# Patient Record
Sex: MALE | Race: WHITE | NOT HISPANIC OR LATINO | Employment: OTHER | URBAN - METROPOLITAN AREA
[De-identification: names, ages, dates, MRNs, and addresses within clinical notes are randomized per-mention and may not be internally consistent; named-entity substitution may affect disease eponyms.]

---

## 2017-03-04 ENCOUNTER — HOSPITAL ENCOUNTER (EMERGENCY)
Facility: CLINIC | Age: 62
Discharge: HOME | End: 2017-03-04
Attending: EMERGENCY MEDICINE | Admitting: EMERGENCY MEDICINE
Payer: COMMERCIAL

## 2017-03-04 VITALS
RESPIRATION RATE: 18 BRPM | BODY MASS INDEX: 28.44 KG/M2 | OXYGEN SATURATION: 94 % | WEIGHT: 210 LBS | HEART RATE: 58 BPM | TEMPERATURE: 98.2 F | HEIGHT: 72 IN | DIASTOLIC BLOOD PRESSURE: 95 MMHG | SYSTOLIC BLOOD PRESSURE: 173 MMHG

## 2017-03-04 DIAGNOSIS — T50.905A MEDICATION SIDE EFFECTS, INITIAL ENCOUNTER: ICD-10-CM

## 2017-03-04 DIAGNOSIS — I10 ESSENTIAL HYPERTENSION: ICD-10-CM

## 2017-03-04 LAB
ANION GAP SERPL CALCULATED.3IONS-SCNC: 8 MMOL/L (ref 3–14)
BASOPHILS # BLD AUTO: 0.1 10E9/L (ref 0–0.2)
BASOPHILS NFR BLD AUTO: 0.9 %
BUN SERPL-MCNC: 18 MG/DL (ref 7–30)
CALCIUM SERPL-MCNC: 8.6 MG/DL (ref 8.5–10.1)
CHLORIDE SERPL-SCNC: 105 MMOL/L (ref 94–109)
CO2 SERPL-SCNC: 27 MMOL/L (ref 20–32)
CREAT SERPL-MCNC: 0.86 MG/DL (ref 0.66–1.25)
DIFFERENTIAL METHOD BLD: NORMAL
EOSINOPHIL # BLD AUTO: 0.1 10E9/L (ref 0–0.7)
EOSINOPHIL NFR BLD AUTO: 1.2 %
ERYTHROCYTE [DISTWIDTH] IN BLOOD BY AUTOMATED COUNT: 13.3 % (ref 10–15)
GFR SERPL CREATININE-BSD FRML MDRD: ABNORMAL ML/MIN/1.7M2
GLUCOSE SERPL-MCNC: 128 MG/DL (ref 70–99)
HCT VFR BLD AUTO: 42.9 % (ref 40–53)
HGB BLD-MCNC: 14.7 G/DL (ref 13.3–17.7)
IMM GRANULOCYTES # BLD: 0 10E9/L (ref 0–0.4)
IMM GRANULOCYTES NFR BLD: 0.4 %
LYMPHOCYTES # BLD AUTO: 2.3 10E9/L (ref 0.8–5.3)
LYMPHOCYTES NFR BLD AUTO: 27.2 %
MAGNESIUM SERPL-MCNC: 2.1 MG/DL (ref 1.6–2.3)
MCH RBC QN AUTO: 29.6 PG (ref 26.5–33)
MCHC RBC AUTO-ENTMCNC: 34.3 G/DL (ref 31.5–36.5)
MCV RBC AUTO: 87 FL (ref 78–100)
MONOCYTES # BLD AUTO: 0.9 10E9/L (ref 0–1.3)
MONOCYTES NFR BLD AUTO: 10.3 %
NEUTROPHILS # BLD AUTO: 5.1 10E9/L (ref 1.6–8.3)
NEUTROPHILS NFR BLD AUTO: 60 %
NRBC # BLD AUTO: 0 10*3/UL
NRBC BLD AUTO-RTO: 0 /100
PLATELET # BLD AUTO: 260 10E9/L (ref 150–450)
POTASSIUM SERPL-SCNC: 4.2 MMOL/L (ref 3.4–5.3)
RBC # BLD AUTO: 4.96 10E12/L (ref 4.4–5.9)
SODIUM SERPL-SCNC: 140 MMOL/L (ref 133–144)
TROPONIN I SERPL-MCNC: NORMAL UG/L (ref 0–0.04)
WBC # BLD AUTO: 8.6 10E9/L (ref 4–11)

## 2017-03-04 PROCEDURE — 83735 ASSAY OF MAGNESIUM: CPT | Performed by: EMERGENCY MEDICINE

## 2017-03-04 PROCEDURE — 99284 EMERGENCY DEPT VISIT MOD MDM: CPT

## 2017-03-04 PROCEDURE — 84484 ASSAY OF TROPONIN QUANT: CPT | Performed by: EMERGENCY MEDICINE

## 2017-03-04 PROCEDURE — 85025 COMPLETE CBC W/AUTO DIFF WBC: CPT | Performed by: EMERGENCY MEDICINE

## 2017-03-04 PROCEDURE — 80048 BASIC METABOLIC PNL TOTAL CA: CPT | Performed by: EMERGENCY MEDICINE

## 2017-03-04 PROCEDURE — 93005 ELECTROCARDIOGRAM TRACING: CPT

## 2017-03-04 RX ORDER — LIDOCAINE 40 MG/G
CREAM TOPICAL
Status: DISCONTINUED | OUTPATIENT
Start: 2017-03-04 | End: 2017-03-04 | Stop reason: HOSPADM

## 2017-03-04 ASSESSMENT — ENCOUNTER SYMPTOMS
NUMBNESS: 1
TREMORS: 1
LIGHT-HEADEDNESS: 1
NERVOUS/ANXIOUS: 1

## 2017-03-04 NOTE — ED PROVIDER NOTES
"  History     Chief Complaint:  Light-headedness    HPI   Mark Anthony Peralta is a 61 year old male, with a history of hypertension, who presents with light-headedness. The patient reports being on Citalopram before and that it worked fine. He took himself off of it after his work stressors dissipated and he felt that he did not need the medication anymore. For the past year he notes having some tingling and numbness, progressively worsening for the past year. Yesterday he saw his PCP to be put back onto Citalopram for anxiety. He notes taking a pill last night and this morning. Today he felt the sudden onset of shakiness and uneasiness in his chest, prompting him to check his blood pressure and found it normal. However, he was still concerned by the \"waves in his body\" and light-headedness, prompting him to come here for evaluation.    Allergies:  No known drug allergies     Medications:    Lisinopril  Atenolol  Citalopram hydrobromide    Past Medical History:    Hypertension    Past Surgical History:    Colon surgery    Family History:    History reviewed. No pertinent family history.     Social History:  Smoking status: current every day  Alcohol use: yes   Marital Status:       Review of Systems   Neurological: Positive for tremors, light-headedness and numbness (feet bilateral for a while).   Psychiatric/Behavioral: The patient is nervous/anxious.    All other systems reviewed and are negative.      Physical Exam     Patient Vitals for the past 24 hrs:   BP Temp Temp src Pulse Heart Rate Resp SpO2 Height Weight   03/04/17 1615 (!) 173/95 - - 58 58 18 94 % - -   03/04/17 1530 (!) 185/95 - - - - - 94 % - -   03/04/17 1514 - 98.2  F (36.8  C) Oral - - 16 - 1.829 m (6') 95.3 kg (210 lb)   03/04/17 1508 (!) 188/99 - - - - - - - -   03/04/17 1502 (!) 188/99 98.2  F (36.8  C) Temporal 67 - 18 98 % 1.829 m (6') 95.3 kg (210 lb)      Physical Exam   Constitutional: He is oriented to person, place, and time. He appears " well-developed.   HENT:   Head: Normocephalic and atraumatic.   Right Ear: External ear normal.   Mouth/Throat: Oropharynx is clear and moist.   Eyes: Conjunctivae and EOM are normal. Pupils are equal, round, and reactive to light.   Neck: Normal range of motion. Neck supple. No JVD present.   Cardiovascular: Normal rate, regular rhythm and normal heart sounds.    Pulmonary/Chest: Effort normal and breath sounds normal.   Abdominal: Soft. Bowel sounds are normal. He exhibits no distension. There is no tenderness. There is no rebound.   Musculoskeletal: Normal range of motion.   Lymphadenopathy:     He has no cervical adenopathy.   Neurological: He is alert and oriented to person, place, and time. He displays normal reflexes. No cranial nerve deficit. He exhibits normal muscle tone. Coordination normal.   Skin: Skin is warm and dry.   Psychiatric: His behavior is normal. Judgment normal. His mood appears anxious.   Nursing note and vitals reviewed.      Emergency Department Course   ECG (15:11:52):  Rate 64 bpm. DE interval 146. QRS duration 82. QT/QTc 420/433. P-R-T axes 18 15 20.   Normal sinus rhythm.  Normal ECG.  Flattening of T wave compared to EKG 3/30/12  Interpreted at 1532 by Nilesh Cooley MD.     Laboratory:  CBC: WNL (WBC 8.6, HGB 14.7, )   BMP: Glucose 128 (H), o/w WNL (Creatinine 0.86)  1559 Troponin: <0.015  Magnesium: 2.1    Emergency Department Course:  Past medical records, nursing notes, and vitals reviewed.  1520: I performed an exam of the patient and obtained history, as documented above.   IV inserted and blood drawn.  1635: I rechecked the patient. Findings and plan explained to the Patient. Patient discharged home with instructions regarding supportive care, medications, and reasons to return. The importance of close follow-up was reviewed.      Impression & Plan      Medical Decision Making:  Mark Anthony Peralta is a 61 year old male who presents with complaints of  light-headedness and not feeling well after taking Citalopram. Patient's neurologic exam is normal. His blood pressure is high, but his mean arterial pressure is in the 120s to 130s and clinically I am not concerned for a hypertensive urgency or emergency. Patient's lab tests are unremarkable. He does have slight flattening of his T waves, but prior EKG is from 4 years ago and has had no real chest pain. Troponin is negative. Patient was offered reassurance. I suspect some of his symptoms are related to serotonin release due to the new SSRI institution and would recommend discontinuing thi and following up with primary care. Patient was asked to return with chest pain, increasing shortness of breath, or stroke-like symptoms.       Diagnosis:    ICD-10-CM    1. Essential hypertension I10    2. Medication side effects, initial encounter T88.7XXA    3. Dizziness    Disposition:  discharged to home    Discharge Medications:  New Prescriptions    No medications on file         Melchor Hunt  3/4/2017   [facility] EMERGENCY DEPARTMENT  I, Melchor Hunt, am serving as a scribe at 3:20 PM on 3/4/2017 to document services personally performed by Nilesh Cooley MD based on my observations and the provider's statements to me.       Nilesh Cooley MD  03/11/17 4455

## 2017-03-04 NOTE — ED AVS SNAPSHOT
[facility]    [address] [place] [address]    Phone:  [phone]    Fax:  [phone]                                       Mark Anthony Peralta   MRN: [id number]    Department:  [facility]   Date of Visit:  3/4/2017           Patient Information     Date Of Birth          1955        Your diagnoses for this visit were:     Essential hypertension     Medication side effects, initial encounter        You were seen by Nilesh Cooley MD.      Follow-up Information     Follow up with Clint Moscoso MD In 3 days.    Specialty:  Internal Medicine    Why:  As needed    Contact information:    [facility]  [address] [place] [address] [phone]          Follow up with Neurology, [facility] In 1 week.    Contact information:    [address]  [address]  [facility] [address]  [phone]          Discharge Instructions       Plesae discontinue your citalopram and follow up with your regular physician.    REturn with chest pain, severe headache, weakness or numbness on one side of the body or other new symptoms.    Continue your home blood pressure medication      Discharge Instructions  Hypertension - High Blood Pressure    During you visit to the Emergency Department, your blood pressure was higher than the recommended blood pressure.  This may be related to stress, pain, medication or other temporary conditions. In these cases, your blood pressure may return to normal on its own. If you have a history of high blood pressure, you may need to have your doctor adjust your medications. Sometimes, your high measurement here may indicate that you have developed high blood pressure that will stay high unless it is treated. Sudden very high blood pressure can cause problems, but usually high blood pressure causes problems over months to years.      Blood pressure is almost never lowered in the  Emergency Department, because studies have shown that lowering blood pressure too quickly is much more dangerous than leaving it alone.    You need to follow up with your doctor in 1-3 days to get your blood pressure rechecked.     Return to the Emergency Department if you start to have:    A severe headache.    Chest pain.    Shortness of breath.    Weakness or numbness that affects one part of the body.    Confusion.    Vision changes.    Significant swelling of legs and/or eyes.    A reaction to any medication started in the Emergency Department.    What can I do to help myself?    Avoid alcohol.    Take any blood pressure medicine that you are prescribed.    Get a good night s sleep.    Lower your salt intake.    Exercise.    Lose weight.    Manage stress.    If blood pressure medication was started in the Emergency Department:    The medicine may not have an immediate effect. The body and brain determine what blood pressure you have. The medicine s job is to retrain the body s  thermostat  to a lower blood pressure.    You will need to follow up with your doctor to see how this medicine is working for you.  If you were given a prescription for medicine here today, be sure to read all of the information (including the package insert) that comes with your prescription.  This will include important information about the medicine, its side effects, and any warnings that you need to know about.  The pharmacist who fills the prescription can provide more information and answer questions you may have about the medicine.  If you have questions or concerns that the pharmacist cannot address, please call or return to the Emergency Department.   Opioid Medication Information    Pain medications are among the most commonly prescribed medicines, so we are including this information for all our patients. If you did not receive pain medication or get a prescription for pain medicine, you can ignore it.     You may have been  given a prescription for an opioid (narcotic) pain medicine and/or have received a pain medicine while here in the Emergency Department. These medicines can make you drowsy or impaired. You must not drive, operate dangerous equipment, or engage in any other dangerous activities while taking these medications. If you drive while taking these medications, you could be arrested for DUI, or driving under the influence. Do not drink any alcohol while you are taking these medications.     Opioid pain medications can cause addiction. If you have a history of chemical dependency of any type, you are at a higher risk of becoming addicted to pain medications.  Only take these prescribed medications to treat your pain when all other options have been tried. Take it for as short a time and as few doses as possible. Store your pain pills in a secure place, as they are frequently stolen and provide a dangerous opportunity for children or visitors in your house to start abusing these powerful medications. We will not replace any lost or stolen medicine.  As soon as your pain is better, you should flush all your remaining medication.     Many prescription pain medications contain Tylenol  (acetaminophen), including Vicodin , Tylenol #3 , Norco , Lortab , and Percocet .  You should not take any extra pills of Tylenol  if you are using these prescription medications or you can get very sick.  Do not ever take more than 3000 mg of acetaminophen in any 24 hour period.    All opioids tend to cause constipation. Drink plenty of water and eat foods that have a lot of fiber, such as fruits, vegetables, prune juice, apple juice and high fiber cereal.  Take a laxative if you don t move your bowels at least every other day. Miralax , Milk of Magnesia, Colace , or Senna  can be used to keep you regular.      Remember that you can always come back to the Emergency Department if you are not able to see your regular doctor in the amount of time  listed above, if you get any new symptoms, or if there is anything that worries you.    Discharge Instructions  Dizziness (Lightheaded)  Today you were seen for dizziness.  Dizziness can be caused by many things.  At this time, your doctor has found no signs that your dizziness is due to a serious or life-threatening condition. However, sometimes there is a serious problem that does not show up right away, and it is important for you to follow up with your regular doctor as instructed.  The most common cause of dizziness is called a vasovagal reaction. This is the kind of dizziness people get when they have their blood drawn or see unpleasant things. This can also happen with dehydration, extreme emotion, nausea, severe pain and also sometimes with urinating or coughing.  This type of dizziness is not serious. It is more common to be lightheaded when you are warm, when you have been standing still for a long time, when you haven t eaten or had very much to drink, and many other factors. Many times there are several things all going on together that caused your spell today. As you get older, your blood vessels get more stiff, and it is common to have dizziness, especially when you first stand up.  If you have been lying down, be sure to sit for a few minutes before standing up, and always sit right down if you feel faint.  Other causes of dizziness include heart disease, irregular heartbeat, side effects from medications, effects of drugs and alcohol, blood pressure changes, infections, and blood loss (anemia). Some of these causes can be serious and even life threatening. Be sure to follow your doctor s discharge instructions for follow up with other doctors to further evaluate your problem.      Return to the Emergency Department if:      You pass out (fainting or falling out), especially during exercise.      You develop chest pain, chest pressure or difficulty breathing.    Your feel an irregular  heartbeat.    You have excessive vaginal bleeding, or blood in your stool or vomit.    You have a high fever.    Your symptoms get worse or more frequent.    If when you begin to feel dizzy, it is important to sit down or lay down immediately to prevent injury from falling.  If you were given a prescription for medicine here today, be sure to read all of the information (including the package insert) that comes with your prescription.  This will include important information about the medicine, its side effects, and any warnings that you need to know about.  The pharmacist who fills the prescription can provide more information and answer questions you may have about the medicine.  If you have questions or concerns that the pharmacist cannot address, please call or return to the Emergency Department.     Opioid Medication Information    Pain medications are among the most commonly prescribed medicines, so we are including this information for all our patients. If you did not receive pain medication or get a prescription for pain medicine, you can ignore it.     You may have been given a prescription for an opioid (narcotic) pain medicine and/or have received a pain medicine while here in the Emergency Department. These medicines can make you drowsy or impaired. You must not drive, operate dangerous equipment, or engage in any other dangerous activities while taking these medications. If you drive while taking these medications, you could be arrested for DUI, or driving under the influence. Do not drink any alcohol while you are taking these medications.     Opioid pain medications can cause addiction. If you have a history of chemical dependency of any type, you are at a higher risk of becoming addicted to pain medications.  Only take these prescribed medications to treat your pain when all other options have been tried. Take it for as short a time and as few doses as possible. Store your pain pills in a secure  place, as they are frequently stolen and provide a dangerous opportunity for children or visitors in your house to start abusing these powerful medications. We will not replace any lost or stolen medicine.  As soon as your pain is better, you should flush all your remaining medication.     Many prescription pain medications contain Tylenol  (acetaminophen), including Vicodin , Tylenol #3 , Norco , Lortab , and Percocet .  You should not take any extra pills of Tylenol  if you are using these prescription medications or you can get very sick.  Do not ever take more than 3000 mg of acetaminophen in any 24 hour period.    All opioids tend to cause constipation. Drink plenty of water and eat foods that have a lot of fiber, such as fruits, vegetables, prune juice, apple juice and high fiber cereal.  Take a laxative if you don t move your bowels at least every other day. Miralax , Milk of Magnesia, Colace , or Senna  can be used to keep you regular.      Remember that you can always come back to the Emergency Department if you are not able to see your regular doctor in the amount of time listed above, if you get any new symptoms, or if there is anything that worries you.                24 Hour Appointment Hotline       To make an appointment at any [facility], call [phone] ([phone]). If you don't have a family doctor or clinic, we will help you find one. [facility] clinics are conveniently located to serve the needs of you and your family.             Review of your medicines      Our records show that you are taking the medicines listed below. If these are incorrect, please call your family doctor or clinic.        Dose / Directions Last dose taken    ATENOLOL PO   Dose:  25 mg        Take 25 mg by mouth   Refills:  0        CITALOPRAM HYDROBROMIDE PO   Dose:  20 mg        Take 20 mg by mouth   Refills:  0        LISINOPRIL PO   Dose:  10 mg        Take 10 mg by mouth   Refills:  0                 Procedures and tests performed during your visit     Basic metabolic panel    CBC with platelets differential    Cardiac Continuous Monitoring    EKG 12 lead    Magnesium    Orthostatic blood pressure and pulse    Peripheral IV: Standard    Pulse oximetry nursing    Troponin I      Orders Needing Specimen Collection     None      Pending Results     No orders found from 3/2/2017 to 3/5/2017.            Pending Culture Results     No orders found from 3/2/2017 to 3/5/2017.             Test Results from your hospital stay     3/4/2017  3:39 PM - Interface, Flexilab Results      Component Results     Component Value Ref Range & Units Status    WBC 8.6 4.0 - 11.0 10e9/L Final    RBC Count 4.96 4.4 - 5.9 10e12/L Final    Hemoglobin 14.7 13.3 - 17.7 g/dL Final    Hematocrit 42.9 40.0 - 53.0 % Final    MCV 87 78 - 100 fl Final    MCH 29.6 26.5 - 33.0 pg Final    MCHC 34.3 31.5 - 36.5 g/dL Final    RDW 13.3 10.0 - 15.0 % Final    Platelet Count 260 150 - 450 10e9/L Final    Diff Method Automated Method  Final    % Neutrophils 60.0 % Final    % Lymphocytes 27.2 % Final    % Monocytes 10.3 % Final    % Eosinophils 1.2 % Final    % Basophils 0.9 % Final    % Immature Granulocytes 0.4 % Final    Nucleated RBCs 0 0 /100 Final    Absolute Neutrophil 5.1 1.6 - 8.3 10e9/L Final    Absolute Lymphocytes 2.3 0.8 - 5.3 10e9/L Final    Absolute Monocytes 0.9 0.0 - 1.3 10e9/L Final    Absolute Eosinophils 0.1 0.0 - 0.7 10e9/L Final    Absolute Basophils 0.1 0.0 - 0.2 10e9/L Final    Abs Immature Granulocytes 0.0 0 - 0.4 10e9/L Final    Absolute Nucleated RBC 0.0  Final         3/4/2017  3:59 PM - Interface, Flexilab Results      Component Results     Component Value Ref Range & Units Status    Sodium 140 133 - 144 mmol/L Final    Potassium 4.2 3.4 - 5.3 mmol/L Final    Chloride 105 94 - 109 mmol/L Final    Carbon Dioxide 27 20 - 32 mmol/L Final    Anion Gap 8 3 - 14 mmol/L Final    Glucose 128 (H) 70 - 99 mg/dL Final    Urea Nitrogen  18 7 - 30 mg/dL Final    Creatinine 0.86 0.66 - 1.25 mg/dL Final    GFR Estimate >90  Non  GFR Calc   >60 mL/min/1.7m2 Final    GFR Estimate If Black >90   GFR Calc   >60 mL/min/1.7m2 Final    Calcium 8.6 8.5 - 10.1 mg/dL Final         3/4/2017  3:59 PM - Interface, Flexilab Results      Component Results     Component Value Ref Range & Units Status    Troponin I ES  0.000 - 0.045 ug/L Final    <0.015  The 99th percentile for upper reference range is 0.045 ug/L.  Troponin values in   the range of 0.045 - 0.120 ug/L may be associated with risks of adverse   clinical events.           3/4/2017  3:59 PM - Interface, Flexilab Results      Component Results     Component Value Ref Range & Units Status    Magnesium 2.1 1.6 - 2.3 mg/dL Final                Clinical Quality Measure: Blood Pressure Screening     Your blood pressure was checked while you were in the emergency department today. The last reading we obtained was  BP: (!) 173/95 . Please read the guidelines below about what these numbers mean and what you should do about them.  If your systolic blood pressure (the top number) is less than 120 and your diastolic blood pressure (the bottom number) is less than 80, then your blood pressure is normal. There is nothing more that you need to do about it.  If your systolic blood pressure (the top number) is 120-139 or your diastolic blood pressure (the bottom number) is 80-89, your blood pressure may be higher than it should be. You should have your blood pressure rechecked within a year by a primary care provider.  If your systolic blood pressure (the top number) is 140 or greater or your diastolic blood pressure (the bottom number) is 90 or greater, you may have high blood pressure. High blood pressure is treatable, but if left untreated over time it can put you at risk for heart attack, stroke, or kidney failure. You should have your blood pressure rechecked by a primary care provider  "within the next 4 weeks.  If your provider in the emergency department today gave you specific instructions to follow-up with your doctor or provider even sooner than that, you should follow that instruction and not wait for up to 4 weeks for your follow-up visit.        Thank you for choosing [facility]       Thank you for choosing [facility] for your care. Our goal is always to provide you with excellent care. Hearing back from our patients is one way we can continue to improve our services. Please take a few minutes to complete the written survey that you may receive in the mail after you visit with us. Thank you!        [facility] Information     [employer] [email or web address] lets you send messages to your doctor, view your test results, renew your prescriptions, schedule appointments and more. To sign up, go to [email or web address] . Click on \"Log in\" on the left side of the screen, which will take you to the Welcome page. Then click on \"Sign up Now\" on the right side of the page.     You will be asked to enter the access code listed below, as well as some personal information. Please follow the directions to create your username and password.     Your access code is: [email or web address]  Expires: 2017  4:39 PM     Your access code will  in 90 days. If you need help or a new code, please call your [facility] clinic or [phone].        Care EveryWhere ID     This is your Care EveryWhere ID. This could be used by other organizations to access your [facility] medical records  [id number]        After Visit Summary       This is your record. Keep this with you and show to your community pharmacist(s) and doctor(s) at your next visit.                  "

## 2017-03-04 NOTE — ED AVS SNAPSHOT
[facility] Emergency Department    [address]    [facility] [address]    Phone:  [phone]    Fax:  [phone]                                       Mark Anthony Peralta   MRN: [id number]    Department:  [facility] Emergency Department   Date of Visit:  3/4/2017           After Visit Summary Signature Page     I have received my discharge instructions, and my questions have been answered. I have discussed any challenges I see with this plan with the nurse or doctor.    ..........................................................................................................................................  Patient/Patient Representative Signature      ..........................................................................................................................................  Patient Representative Print Name and Relationship to Patient    ..................................................               ................................................  Date                                            Time    ..........................................................................................................................................  Reviewed by Signature/Title    ...................................................              ..............................................  Date                                                            Time

## 2017-03-04 NOTE — ED NOTES
Patient arrives complaining of dizziness and tingling/numbness in bilat feet and hands. He is alert and oriented, ABCs intact.

## 2017-03-04 NOTE — DISCHARGE INSTRUCTIONS
Darrell discontinue your citalopram and follow up with your regular physician.    REturn with chest pain, severe headache, weakness or numbness on one side of the body or other new symptoms.    Continue your home blood pressure medication      Discharge Instructions  Hypertension - High Blood Pressure    During you visit to the Emergency Department, your blood pressure was higher than the recommended blood pressure.  This may be related to stress, pain, medication or other temporary conditions. In these cases, your blood pressure may return to normal on its own. If you have a history of high blood pressure, you may need to have your doctor adjust your medications. Sometimes, your high measurement here may indicate that you have developed high blood pressure that will stay high unless it is treated. Sudden very high blood pressure can cause problems, but usually high blood pressure causes problems over months to years.      Blood pressure is almost never lowered in the Emergency Department, because studies have shown that lowering blood pressure too quickly is much more dangerous than leaving it alone.    You need to follow up with your doctor in 1-3 days to get your blood pressure rechecked.     Return to the Emergency Department if you start to have:    A severe headache.    Chest pain.    Shortness of breath.    Weakness or numbness that affects one part of the body.    Confusion.    Vision changes.    Significant swelling of legs and/or eyes.    A reaction to any medication started in the Emergency Department.    What can I do to help myself?    Avoid alcohol.    Take any blood pressure medicine that you are prescribed.    Get a good night s sleep.    Lower your salt intake.    Exercise.    Lose weight.    Manage stress.    If blood pressure medication was started in the Emergency Department:    The medicine may not have an immediate effect. The body and brain determine what blood pressure you have. The medicine s  job is to retrain the body s  thermostat  to a lower blood pressure.    You will need to follow up with your doctor to see how this medicine is working for you.  If you were given a prescription for medicine here today, be sure to read all of the information (including the package insert) that comes with your prescription.  This will include important information about the medicine, its side effects, and any warnings that you need to know about.  The pharmacist who fills the prescription can provide more information and answer questions you may have about the medicine.  If you have questions or concerns that the pharmacist cannot address, please call or return to the Emergency Department.   Opioid Medication Information    Pain medications are among the most commonly prescribed medicines, so we are including this information for all our patients. If you did not receive pain medication or get a prescription for pain medicine, you can ignore it.     You may have been given a prescription for an opioid (narcotic) pain medicine and/or have received a pain medicine while here in the Emergency Department. These medicines can make you drowsy or impaired. You must not drive, operate dangerous equipment, or engage in any other dangerous activities while taking these medications. If you drive while taking these medications, you could be arrested for DUI, or driving under the influence. Do not drink any alcohol while you are taking these medications.     Opioid pain medications can cause addiction. If you have a history of chemical dependency of any type, you are at a higher risk of becoming addicted to pain medications.  Only take these prescribed medications to treat your pain when all other options have been tried. Take it for as short a time and as few doses as possible. Store your pain pills in a secure place, as they are frequently stolen and provide a dangerous opportunity for children or visitors in your house to start  abusing these powerful medications. We will not replace any lost or stolen medicine.  As soon as your pain is better, you should flush all your remaining medication.     Many prescription pain medications contain Tylenol  (acetaminophen), including Vicodin , Tylenol #3 , Norco , Lortab , and Percocet .  You should not take any extra pills of Tylenol  if you are using these prescription medications or you can get very sick.  Do not ever take more than 3000 mg of acetaminophen in any 24 hour period.    All opioids tend to cause constipation. Drink plenty of water and eat foods that have a lot of fiber, such as fruits, vegetables, prune juice, apple juice and high fiber cereal.  Take a laxative if you don t move your bowels at least every other day. Miralax , Milk of Magnesia, Colace , or Senna  can be used to keep you regular.      Remember that you can always come back to the Emergency Department if you are not able to see your regular doctor in the amount of time listed above, if you get any new symptoms, or if there is anything that worries you.    Discharge Instructions  Dizziness (Lightheaded)  Today you were seen for dizziness.  Dizziness can be caused by many things.  At this time, your doctor has found no signs that your dizziness is due to a serious or life-threatening condition. However, sometimes there is a serious problem that does not show up right away, and it is important for you to follow up with your regular doctor as instructed.  The most common cause of dizziness is called a vasovagal reaction. This is the kind of dizziness people get when they have their blood drawn or see unpleasant things. This can also happen with dehydration, extreme emotion, nausea, severe pain and also sometimes with urinating or coughing.  This type of dizziness is not serious. It is more common to be lightheaded when you are warm, when you have been standing still for a long time, when you haven t eaten or had very much to  drink, and many other factors. Many times there are several things all going on together that caused your spell today. As you get older, your blood vessels get more stiff, and it is common to have dizziness, especially when you first stand up.  If you have been lying down, be sure to sit for a few minutes before standing up, and always sit right down if you feel faint.  Other causes of dizziness include heart disease, irregular heartbeat, side effects from medications, effects of drugs and alcohol, blood pressure changes, infections, and blood loss (anemia). Some of these causes can be serious and even life threatening. Be sure to follow your doctor s discharge instructions for follow up with other doctors to further evaluate your problem.      Return to the Emergency Department if:      You pass out (fainting or falling out), especially during exercise.      You develop chest pain, chest pressure or difficulty breathing.    Your feel an irregular heartbeat.    You have excessive vaginal bleeding, or blood in your stool or vomit.    You have a high fever.    Your symptoms get worse or more frequent.    If when you begin to feel dizzy, it is important to sit down or lay down immediately to prevent injury from falling.  If you were given a prescription for medicine here today, be sure to read all of the information (including the package insert) that comes with your prescription.  This will include important information about the medicine, its side effects, and any warnings that you need to know about.  The pharmacist who fills the prescription can provide more information and answer questions you may have about the medicine.  If you have questions or concerns that the pharmacist cannot address, please call or return to the Emergency Department.     Opioid Medication Information    Pain medications are among the most commonly prescribed medicines, so we are including this information for all our patients. If you did  not receive pain medication or get a prescription for pain medicine, you can ignore it.     You may have been given a prescription for an opioid (narcotic) pain medicine and/or have received a pain medicine while here in the Emergency Department. These medicines can make you drowsy or impaired. You must not drive, operate dangerous equipment, or engage in any other dangerous activities while taking these medications. If you drive while taking these medications, you could be arrested for DUI, or driving under the influence. Do not drink any alcohol while you are taking these medications.     Opioid pain medications can cause addiction. If you have a history of chemical dependency of any type, you are at a higher risk of becoming addicted to pain medications.  Only take these prescribed medications to treat your pain when all other options have been tried. Take it for as short a time and as few doses as possible. Store your pain pills in a secure place, as they are frequently stolen and provide a dangerous opportunity for children or visitors in your house to start abusing these powerful medications. We will not replace any lost or stolen medicine.  As soon as your pain is better, you should flush all your remaining medication.     Many prescription pain medications contain Tylenol  (acetaminophen), including Vicodin , Tylenol #3 , Norco , Lortab , and Percocet .  You should not take any extra pills of Tylenol  if you are using these prescription medications or you can get very sick.  Do not ever take more than 3000 mg of acetaminophen in any 24 hour period.    All opioids tend to cause constipation. Drink plenty of water and eat foods that have a lot of fiber, such as fruits, vegetables, prune juice, apple juice and high fiber cereal.  Take a laxative if you don t move your bowels at least every other day. Miralax , Milk of Magnesia, Colace , or Senna  can be used to keep you regular.      Remember that you can  always come back to the Emergency Department if you are not able to see your regular doctor in the amount of time listed above, if you get any new symptoms, or if there is anything that worries you.

## 2017-03-05 LAB — INTERPRETATION ECG - MUSE: NORMAL

## 2023-04-17 ENCOUNTER — APPOINTMENT (OUTPATIENT)
Dept: CT IMAGING | Facility: CLINIC | Age: 68
End: 2023-04-17
Attending: EMERGENCY MEDICINE
Payer: COMMERCIAL

## 2023-04-17 ENCOUNTER — HOSPITAL ENCOUNTER (EMERGENCY)
Facility: CLINIC | Age: 68
Discharge: HOME | End: 2023-04-17
Attending: EMERGENCY MEDICINE | Admitting: EMERGENCY MEDICINE
Payer: COMMERCIAL

## 2023-04-17 VITALS
HEIGHT: 72 IN | DIASTOLIC BLOOD PRESSURE: 76 MMHG | BODY MASS INDEX: 27.32 KG/M2 | OXYGEN SATURATION: 99 % | SYSTOLIC BLOOD PRESSURE: 136 MMHG | TEMPERATURE: 98.7 F | RESPIRATION RATE: 18 BRPM | HEART RATE: 71 BPM | WEIGHT: 201.72 LBS

## 2023-04-17 DIAGNOSIS — K42.9 PERIUMBILICAL HERNIA: ICD-10-CM

## 2023-04-17 LAB
ALBUMIN SERPL BCG-MCNC: 4.3 G/DL (ref 3.5–5.2)
ALP SERPL-CCNC: 115 U/L (ref 40–129)
ALT SERPL W P-5'-P-CCNC: 17 U/L (ref 10–50)
ANION GAP SERPL CALCULATED.3IONS-SCNC: 10 MMOL/L (ref 7–15)
AST SERPL W P-5'-P-CCNC: 19 U/L (ref 10–50)
BASOPHILS # BLD AUTO: 0.1 10E3/UL (ref 0–0.2)
BASOPHILS NFR BLD AUTO: 1 %
BILIRUB SERPL-MCNC: 0.3 MG/DL
BUN SERPL-MCNC: 13.1 MG/DL (ref 8–23)
CALCIUM SERPL-MCNC: 9.6 MG/DL (ref 8.8–10.2)
CHLORIDE SERPL-SCNC: 98 MMOL/L (ref 98–107)
CREAT SERPL-MCNC: 0.9 MG/DL (ref 0.67–1.17)
DEPRECATED HCO3 PLAS-SCNC: 30 MMOL/L (ref 22–29)
EOSINOPHIL # BLD AUTO: 0.1 10E3/UL (ref 0–0.7)
EOSINOPHIL NFR BLD AUTO: 1 %
ERYTHROCYTE [DISTWIDTH] IN BLOOD BY AUTOMATED COUNT: 13.2 % (ref 10–15)
GFR SERPL CREATININE-BSD FRML MDRD: >90 ML/MIN/1.73M2
GLUCOSE SERPL-MCNC: 144 MG/DL (ref 70–99)
HCT VFR BLD AUTO: 45.9 % (ref 40–53)
HGB BLD-MCNC: 15.7 G/DL (ref 13.3–17.7)
IMM GRANULOCYTES # BLD: 0.1 10E3/UL
IMM GRANULOCYTES NFR BLD: 1 %
LIPASE SERPL-CCNC: 29 U/L (ref 13–60)
LYMPHOCYTES # BLD AUTO: 2.4 10E3/UL (ref 0.8–5.3)
LYMPHOCYTES NFR BLD AUTO: 26 %
MCH RBC QN AUTO: 31 PG (ref 26.5–33)
MCHC RBC AUTO-ENTMCNC: 34.2 G/DL (ref 31.5–36.5)
MCV RBC AUTO: 91 FL (ref 78–100)
MONOCYTES # BLD AUTO: 0.9 10E3/UL (ref 0–1.3)
MONOCYTES NFR BLD AUTO: 9 %
NEUTROPHILS # BLD AUTO: 5.6 10E3/UL (ref 1.6–8.3)
NEUTROPHILS NFR BLD AUTO: 62 %
NRBC # BLD AUTO: 0 10E3/UL
NRBC BLD AUTO-RTO: 0 /100
PLAT MORPH BLD: NORMAL
PLATELET # BLD AUTO: 193 10E3/UL (ref 150–450)
POTASSIUM SERPL-SCNC: 3.8 MMOL/L (ref 3.4–5.3)
PROT SERPL-MCNC: 6.9 G/DL (ref 6.4–8.3)
RBC # BLD AUTO: 5.06 10E6/UL (ref 4.4–5.9)
RBC MORPH BLD: NORMAL
SODIUM SERPL-SCNC: 138 MMOL/L (ref 136–145)
WBC # BLD AUTO: 9 10E3/UL (ref 4–11)

## 2023-04-17 PROCEDURE — 74176 CT ABD & PELVIS W/O CONTRAST: CPT

## 2023-04-17 PROCEDURE — 80053 COMPREHEN METABOLIC PANEL: CPT | Performed by: EMERGENCY MEDICINE

## 2023-04-17 PROCEDURE — 99284 EMERGENCY DEPT VISIT MOD MDM: CPT | Mod: 25

## 2023-04-17 PROCEDURE — 36415 COLL VENOUS BLD VENIPUNCTURE: CPT | Performed by: EMERGENCY MEDICINE

## 2023-04-17 PROCEDURE — 83690 ASSAY OF LIPASE: CPT | Performed by: EMERGENCY MEDICINE

## 2023-04-17 PROCEDURE — 85025 COMPLETE CBC W/AUTO DIFF WBC: CPT | Performed by: EMERGENCY MEDICINE

## 2023-04-17 ASSESSMENT — ENCOUNTER SYMPTOMS
VOMITING: 0
FEVER: 0

## 2023-04-17 ASSESSMENT — ACTIVITIES OF DAILY LIVING (ADL): ADLS_ACUITY_SCORE: 35

## 2023-04-17 NOTE — DISCHARGE INSTRUCTIONS
The hernia around your bellybutton contains a small amount of fat at times.  No urgent or emergent procedure needs to occur at this time.  You may follow-up with a general surgeon as an outpatient if you want to discuss having this fixed.  If your pain worsens you should follow-up immediately for recheck.

## 2023-04-17 NOTE — ED TRIAGE NOTES
Pt arrives to the ED due to having concern for hernia. Pt states that he has a known hernia but that since Friday he can feel it more than normal and has a bump near his belly button. Denies nausea/vomting. Here because he has a hip replacement scheduled for Thurs and he called them and they told him to get it checked out in case they need to postpone the procedure.

## 2023-04-17 NOTE — ED NOTES
Rapid Assessment Note    History:   Mark Anthony Peralta is a 68 year old male who presents with a lump in the anterior abdomen near the umbilicus which has been present for some time but is worsened in the past week and has become painful with touch and slightly discolored.  He has upcoming hip surgery and was told by his orthopedic surgeon to come to the ED to evaluate the possible hernia and see if it needs to be dealt with prior to his hip surgery.  He denies any vomiting or any change in his bowel movements.    Exam:   Physical Exam  Vitals and nursing note reviewed.   Constitutional:       General: He is not in acute distress.     Appearance: Normal appearance. He is not ill-appearing.   HENT:      Head: Normocephalic and atraumatic.      Right Ear: External ear normal.      Left Ear: External ear normal.   Eyes:      Extraocular Movements: Extraocular movements intact.      Conjunctiva/sclera: Conjunctivae normal.   Pulmonary:      Effort: Pulmonary effort is normal. No respiratory distress.   Abdominal:      General: Abdomen is flat.      Tenderness: There is abdominal tenderness in the periumbilical area.      Hernia: A hernia is present. Hernia is present in the umbilical area.   Musculoskeletal:         General: No deformity or signs of injury.      Cervical back: Normal range of motion and neck supple.   Skin:     General: Skin is warm and dry.      Findings: No rash.   Neurological:      Mental Status: He is alert and oriented to person, place, and time.   Psychiatric:         Behavior: Behavior normal.           Plan of Care:   I evaluated the patient and developed an initial plan of care. I discussed this plan and explained that I, or one of my partners, would be returning to complete the evaluation.     04/17/2023  [facility]         Sudhakar Belcher MD  04/17/23 2955

## 2023-04-17 NOTE — ED PROVIDER NOTES
History     Chief Complaint:  Abdominal Pain       HPI   Mark Anthony Peralta is a 68 year old male presents emergency department for evaluation of periumbilical hernia.  Patient reports his bellybutton has been bulging out, he mentioned it to his orthopedic surgeon as he is supposed to have hip surgery in about a week and his orthopedic surgeon recommended he get the periumbilical hernia checked out.  Patient had prior bowel surgery in that area.  This was years ago.  The area is not tender or painful.  He reports it bulges out a bit when he stands up but it is soft and reducible.  He has had no vomiting.  He denies any difficulty eating.      Independent Historian:   None - Patient Only    Review of External Notes:      ROS:  Review of Systems   Constitutional: Negative for fever.   Gastrointestinal: Negative for vomiting.       Allergies:  No Known Allergies     Medications:    ATENOLOL PO  CITALOPRAM HYDROBROMIDE PO  LISINOPRIL PO        Past Medical History:    Past Medical History:   Diagnosis Date     Hypertension        Past Surgical History:    Past Surgical History:   Procedure Laterality Date     COLON SURGERY          Family History:    family history is not on file.    Social History:   reports that he has been smoking. He does not have any smokeless tobacco history on file. He reports current alcohol use. He reports that he does not use drugs.  PCP: Keyon Delgado     Physical Exam     Patient Vitals for the past 24 hrs:   BP Temp Temp src Pulse Resp SpO2 Height Weight   04/17/23 1211 136/76 98.7  F (37.1  C) Oral 63 20 98 % 1.829 m (6') 91.5 kg (201 lb 11.5 oz)        Physical Exam  Gen: well appearing, in no acute distress  Oral : Mucous membranes moist,   Nose: No rhinorhea  Ears: External near normal, without drainage  Eyes: periorbital tissues and sclera normal   Neck: supple, no abnormal swelling  Lungs: no tachypnea or distress, speaks full sentences  CV: Regular rate,  Abd: soft, nontender,  nondistended, no rebound/guarding, periumbilical area is soft and nontender  Ext: no lower extremity edema  Skin: warm, dry, well perfused, no rashes/bruising/lesions on exposed skin, no evidence of cellulitis of the abdomen  Neuro: alert, no gross motor or sensory deficits,   Psych: pleasant mood, normal affect      Emergency Department Course   [unfilled]    Imaging:  CT Abdomen Pelvis w/o Contrast   Final Result   IMPRESSION:    1.  Fat-containing hernias above the umbilicus and a fat-containing   periumbilical hernia noted, minimal associated stranding.   2.  Direct and indirect right inguinal hernias both containing   nonobstructed bowel.      INNA TAN MD            SYSTEM ID:  [id number]         Report per radiology    Laboratory:  Labs Ordered and Resulted from Time of ED Arrival to Time of ED Departure   COMPREHENSIVE METABOLIC PANEL - Abnormal       Result Value    Sodium 138      Potassium 3.8      Chloride 98      Carbon Dioxide (CO2) 30 (*)     Anion Gap 10      Urea Nitrogen 13.1      Creatinine 0.90      Calcium 9.6      Glucose 144 (*)     Alkaline Phosphatase 115      AST 19      ALT 17      Protein Total 6.9      Albumin 4.3      Bilirubin Total 0.3      GFR Estimate >90     LIPASE - Normal    Lipase 29     CBC WITH PLATELETS AND DIFFERENTIAL    WBC Count 9.0      RBC Count 5.06      Hemoglobin 15.7      Hematocrit 45.9      MCV 91      MCH 31.0      MCHC 34.2      RDW 13.2      Platelet Count 193      % Neutrophils 62      % Lymphocytes 26      % Monocytes 9      % Eosinophils 1      % Basophils 1      % Immature Granulocytes 1      NRBCs per 100 WBC 0      Absolute Neutrophils 5.6      Absolute Lymphocytes 2.4      Absolute Monocytes 0.9      Absolute Eosinophils 0.1      Absolute Basophils 0.1      Absolute Immature Granulocytes 0.1      Absolute NRBCs 0.0     RBC AND PLATELET MORPHOLOGY    Platelet Assessment        Value: Automated Count Confirmed. Platelet morphology is normal.    RBC  Morphology Confirmed RBC Indices          Procedures       Emergency Department Course & Assessments:             Interventions:  Medications - No data to display     Assessments:      Independent Interpretation (X-rays, CTs, rhythm strip):  None    Consultations/Discussion of Management or Tests:  None        Social Determinants of Health affecting care:   None    Disposition:  The patient was discharged to home.     Impression & Plan    CMS Diagnoses:   and None          Medical Decision Making:  Patient presents emergency department with concern over periumbilical hernia.  Hoping it would not interfere with his upcoming hip surgery.  Patient has small fat-containing hernias in that area there is no evidence of bowel incarceration or obstruction.  Additionally has some inguinal hernias with out evidence of strangulation or obstruction.  Discussed how these can be managed as an outpatient with a surgical consult as needed.  Recommended return with worsening pain in the areas of the hernias.  Patient expressed agreement with plan.        Diagnosis:    ICD-10-CM    1. Periumbilical hernia  K42.9            Discharge Medications:  New Prescriptions    No medications on file          Walt Dimas MD  4/17/2023   Walt Dimas,*        Walt Dimas MD  04/17/23 1749